# Patient Record
Sex: MALE | Race: WHITE | HISPANIC OR LATINO | URBAN - METROPOLITAN AREA
[De-identification: names, ages, dates, MRNs, and addresses within clinical notes are randomized per-mention and may not be internally consistent; named-entity substitution may affect disease eponyms.]

---
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
## 2024-02-05 ENCOUNTER — EMERGENCY (EMERGENCY)
Facility: HOSPITAL | Age: 19
LOS: 1 days | Discharge: HOME | End: 2024-02-05
Attending: EMERGENCY MEDICINE
Payer: COMMERCIAL

## 2024-02-05 VITALS
SYSTOLIC BLOOD PRESSURE: 123 MMHG | RESPIRATION RATE: 16 BRPM | OXYGEN SATURATION: 98 % | TEMPERATURE: 98 F | DIASTOLIC BLOOD PRESSURE: 79 MMHG | HEART RATE: 98 BPM | HEIGHT: 69 IN | WEIGHT: 283.73 LBS

## 2024-02-05 PROCEDURE — 10080 I&D PILONIDAL CYST SIMPLE: CPT

## 2024-02-05 PROCEDURE — 99282 EMERGENCY DEPT VISIT SF MDM: CPT

## 2024-02-05 PROCEDURE — 99284 EMERGENCY DEPT VISIT MOD MDM: CPT | Mod: 25

## 2024-02-05 NOTE — ED PROVIDER NOTE - MDM ORDERS SUBMITTED SELECTION
Addended by: ROBIN SANCHEZ on: 3/14/2017 11:50 AM     Modules accepted: Orders    
Called and spoke to pt agreed to cancel appt declined to reschedule at the time. No further questions or concerns.  
Not Applicable

## 2024-02-05 NOTE — ED PROVIDER NOTE - OBJECTIVE STATEMENT
18-year-old male presents to ED complaining of lower back pain times x 4 days.  Patient explained that his pain got severe within the last 2 days.  Patient said he noticed some swelling in his lower back.  Patient stated he had this similar issue in the past x 2 years ago and open on his own and drained a lot of pus at home.  Patient also stated his mother has a similar issue with abscess in her lower back.  Patient denies any fever, chills, lower extremity weakness numbness or paresthesia.  Patient denies any issue at this time.  Patient admits to history of childhood asthma only.  Patient denies any current medication or allergies to medication.

## 2024-02-05 NOTE — ED PROVIDER NOTE - PROGRESS NOTE DETAILS
Incision and drainage performed.  Patient tolerated procedure well.  Patient D/C stable condition with instructions to perform sitz bath as discussed and over-the-counter pain control

## 2024-02-05 NOTE — ED PROVIDER NOTE - CLINICAL SUMMARY MEDICAL DECISION MAKING FREE TEXT BOX
18-year-old male presents to ED complaining of lower back pain times x 4 days.  Patient explained that his pain got severe within the last 2 days.  Patient said he noticed some swelling in his lower back.  Patient stated he had this similar issue in the past x 2 years ago and open on his own and drained a lot of pus at home.  Patient also stated his mother has a similar issue with abscess in her lower back.  Patient denies any fever, chills, lower extremity weakness numbness or paresthesia.  Patient denies any issue at this time.   HEENT: Normal findings, Eyes : PERRLA, EOMI , Nares clear and Throat : WNL  Lungs: Clear B/L with good air entry  CVS: S1-S2 , with no murmurs  Abd : Normal BS, with no tenderness or organomegaly  Ext: Normal findings  Findings of Pilonidal cyst to lumbar spine.  I&D performed and pt D/C in stable condition.

## 2024-02-05 NOTE — ED PROVIDER NOTE - PATIENT PORTAL LINK FT
You can access the FollowMyHealth Patient Portal offered by [facility] by registering at the following website: [email or web address]. By joining [facility]’s FollowMyHealth portal, you will also be able to view your health information using other applications (apps) compatible with our system.

## 2024-02-05 NOTE — ED PROVIDER NOTE - NSFOLLOWUPCLINICS_GEN_ALL_ED_FT
[facility] Acute Care Surgery  Acute Care Surgery  [address] [place] [id number]  Phone: [phone]  Fax:

## 2024-02-05 NOTE — ED PROVIDER NOTE - PHYSICAL EXAMINATION
examination positive for pilonidal cyst.  Patient is stable and not in any acute distress.  Patient is nontoxic.

## 2024-07-17 ENCOUNTER — EMERGENCY (EMERGENCY)
Facility: HOSPITAL | Age: 19
LOS: 1 days | Discharge: HOME | End: 2024-07-17
Attending: EMERGENCY MEDICINE
Payer: COMMERCIAL

## 2024-07-17 VITALS
HEART RATE: 101 BPM | WEIGHT: 294.98 LBS | RESPIRATION RATE: 18 BRPM | TEMPERATURE: 98 F | DIASTOLIC BLOOD PRESSURE: 86 MMHG | SYSTOLIC BLOOD PRESSURE: 139 MMHG | OXYGEN SATURATION: 98 %

## 2024-07-17 PROCEDURE — 99283 EMERGENCY DEPT VISIT LOW MDM: CPT

## 2024-07-17 PROCEDURE — 99282 EMERGENCY DEPT VISIT SF MDM: CPT

## 2024-07-17 RX ORDER — CLINDAMYCIN PHOSPHATE 150 MG/ML
1 INJECTION, SOLUTION INTRAVENOUS
Qty: 40 | Refills: 0
Start: 2024-07-17 | End: 2024-07-26

## 2024-07-17 NOTE — ED PROVIDER NOTE - ATTENDING APP SHARED VISIT CONTRIBUTION OF CARE
Patient no significant past medical history presents emergency room for evaluation of wound to the left lower leg x3 days.  Patient states that he has an active draining wound with 2 other wounds that are painful with no other surrounding swelling redness tenderness.  Patient states went to urgent care clinic today was sent into the ED.  Patient denies fever chills nausea vomiting headache dizziness weakness nausea vomiting shortness of breath difficulty breathing or leg swelling.     Physical exam with small amount of swelling in the left medial aspect of the leg with no surrounding erythema swelling.  Moderate amount of drainage, patient DC home with p.o. antibiotics follow-up with PCP supportive care low threshold for return for lack of improvement.      Impression: Cellulitis with small abscess    I, Luis Daniel Buitrago, performed the initial face to face bedside interview with this patient regarding history of present illness, review of symptoms and relevant past medical, social and family history.  I completed an independent physical examination.  I was the initial provider who evaluated this patient. I have signed out the follow up of any pending tests (i.e. labs, radiological studies) to the ACP.  I have communicated the patient’s plan of care and disposition with the ACP.

## 2024-07-17 NOTE — ED PROVIDER NOTE - CLINICAL SUMMARY MEDICAL DECISION MAKING FREE TEXT BOX
Patient no significant past medical history presents emergency room for evaluation of wound to the left lower leg x3 days.  Patient states that he has an active draining wound with 2 other wounds that are painful with no other surrounding swelling redness tenderness.  Patient states went to urgent care clinic today was sent into the ED.  Patient denies fever chills nausea vomiting headache dizziness weakness nausea vomiting shortness of breath difficulty breathing or leg swelling. Patient with small amount of swelling in the left medial aspect of the leg with no surrounding erythema swelling.  Moderate amount of drainage, patient DC home with p.o. antibiotics follow-up with PCP supportive care low threshold for return for lack of improvement.  Patient and family educated about when to return to the ED if needed. PT verbalizes that he understands all instructions and results. Pt infomred that ED is open and availible 24/7 365 days a yr, encouraged to return to the ED if they have any change in condition, or feel the need for revaluation.

## 2024-07-17 NOTE — ED PROVIDER NOTE - PATIENT PORTAL LINK FT
You can access the FollowMyHealth Patient Portal offered by [facility] by registering at the following website: [email or web address]. By joining [facility]’s FollowMyHealth portal, you will also be able to view your health information using other applications (apps) compatible with our system.

## 2024-07-17 NOTE — ED PROVIDER NOTE - ADDITIONAL NOTES AND INSTRUCTIONS:
PT was evaluated At [facility] ED and was found to have a condition that warranted time of to rest and heal from WORK/SCHOOL.   Chapito Nelson PA-C

## 2024-07-17 NOTE — ED PROVIDER NOTE - NSFOLLOWUPINSTRUCTIONS_ED_ALL_ED_FT
Patient education: Skin abscess (The Basics)  Written by the doctors and editors at [facility]  Please read the Disclaimer at the end of this page.    What is a skin abscess?  A skin abscess is a painful bump that forms when pus collects under the skin (picture 1). It looks similar to a pimple, but is usually much larger. Skin abscesses most often form when there is cut or nick in the skin that allows bacteria from the skin's surface to get in. Sometimes, an ingrown hair can cause a skin abscess to form.    The bacterial infection causes the skin to become swollen and painful. It also makes the skin look red or darker in color. Pus forms as the body tries to fight off the bacteria.    Less often, a skin abscess might be caused by another type of germ, like a virus, fungus, or parasite.    What are the symptoms of a skin abscess?  Symptoms might include:    ?A large bump that looks like a pimple – The bump might drain fluid or pus.    ?Swollen or red skin around the abscess    ?Pain, especially when touched    In some cases, a skin abscess might also cause fever or chills. But this is uncommon.    Skin abscesses can form anywhere on the body. But they are most common on the arms, legs, back, chest, and belly.    Will I need tests?  Probably not. In most cases, your doctor or nurse can tell if you have a skin abscess by asking about your symptoms and doing an exam.    In rare cases when tests are needed, they might include:    ?Lab tests    ?Imaging tests – These create pictures of the inside of the body.    In some cases, your doctor or nurse might want to do lab tests on the pus from the abscess.    How is a skin abscess treated?  With most abscesses, a doctor will make a small cut on the surface of the abscess to drain out the pus. For small abscesses that are draining pus on their own, cutting into the abscess might not be needed. "Small" usually means less than 3/4 inch (2 cm) in size.    In most cases, your doctor or nurse will also prescribe an antibiotic medicine. Antibiotics help kill the bacteria that caused the abscess and keep the skin from getting infected again. If you get antibiotics, finish all of the medicine and take it exactly as instructed. Never skip doses or stop taking the medicine without talking to your doctor or nurse.    Is there anything I can do on my own to feel better?  Yes. To relieve symptoms and help your skin abscess drain, you can put a warm, wet compress on the area:    ?Wet a clean washcloth with warm water, and put it over your abscess.    ?When the washcloth cools, reheat it with warm water and put it back over the abscess.    ?Repeat these steps for about 15 minutes, and try to do this 4 times a day.    Do not try to squeeze or pop an abscess. This can make it worse.    When should I call the doctor?  Call your doctor or nurse if:    ?Your abscess is getting bigger.    ?You have signs that your infection is getting worse – These include a fever of 100.4°F (38°C) or higher, or more redness around the abscess.    ?Your abscess is on your breast or in your genital or anal area.

## 2024-07-17 NOTE — ED PROVIDER NOTE - OBJECTIVE STATEMENT
Patient no significant past medical history presents emergency room for evaluation of wound to the left lower leg x3 days.  Patient states that he has an active draining wound with 2 other wounds that are painful with no other surrounding swelling redness tenderness.  Patient states went to urgent care clinic today was sent into the ED.  Patient denies fever chills nausea vomiting headache dizziness weakness nausea vomiting shortness of breath difficulty breathing or leg swelling.